# Patient Record
Sex: MALE | Race: WHITE
[De-identification: names, ages, dates, MRNs, and addresses within clinical notes are randomized per-mention and may not be internally consistent; named-entity substitution may affect disease eponyms.]

---

## 2017-09-09 ENCOUNTER — HOSPITAL ENCOUNTER (EMERGENCY)
Dept: HOSPITAL 54 - ER | Age: 37
Discharge: LEFT BEFORE DISCHARGE | End: 2017-09-09
Payer: COMMERCIAL

## 2017-09-09 VITALS — WEIGHT: 160 LBS | BODY MASS INDEX: 22.9 KG/M2 | HEIGHT: 70 IN

## 2017-09-09 VITALS — SYSTOLIC BLOOD PRESSURE: 110 MMHG | DIASTOLIC BLOOD PRESSURE: 70 MMHG

## 2017-09-09 DIAGNOSIS — Z91.018: ICD-10-CM

## 2017-09-09 DIAGNOSIS — T78.2XXA: Primary | ICD-10-CM

## 2017-09-09 DIAGNOSIS — Z91.011: ICD-10-CM

## 2017-09-09 PROCEDURE — Z7610: HCPCS

## 2017-09-09 PROCEDURE — A4606 OXYGEN PROBE USED W OXIMETER: HCPCS

## 2017-09-09 NOTE — NUR
To bed 8 a 35 yo male bibself w c/o allergic reaction to dairy x 1.5 hours ago, 
hives, appears flushed. No sob noted. breathing even and unalbored. vss. 
nondiaphoretic. placed on cardiac and vs monitor. Dr Munson at bedside to [facility].

## 2017-09-09 NOTE — NUR
Dr Munson talking to patient, patient decided to leave ama after being 
explained with the risks and benefits. AMA formed signed.

## 2017-09-09 NOTE — NUR
IV removed. Catheter intact and site benign. Pressure and 4x4 applied to site. 
No bleeding noted. Patient discharged to home in stable condition. Written and 
verbal after care instructions given. Patient verbalizes understanding of 
instruction. Patient is ambulatory with steady gait, Instructed not to drive. 
No further complaints.

## 2017-10-04 ENCOUNTER — HOSPITAL ENCOUNTER (EMERGENCY)
Dept: HOSPITAL 54 - ER | Age: 37
Discharge: HOME | End: 2017-10-04
Payer: COMMERCIAL

## 2017-10-04 VITALS — DIASTOLIC BLOOD PRESSURE: 82 MMHG | SYSTOLIC BLOOD PRESSURE: 128 MMHG

## 2017-10-04 VITALS — HEIGHT: 70 IN | BODY MASS INDEX: 23.62 KG/M2 | WEIGHT: 165 LBS
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
## 2017-10-04 DIAGNOSIS — T78.40XA: Primary | ICD-10-CM

## 2017-10-04 PROCEDURE — Z7610: HCPCS

## 2017-10-04 PROCEDURE — A4606 OXYGEN PROBE USED W OXIMETER: HCPCS

## 2017-10-04 PROCEDURE — 99284 EMERGENCY DEPT VISIT MOD MDM: CPT

## 2017-10-04 PROCEDURE — 96374 THER/PROPH/DIAG INJ IV PUSH: CPT

## 2017-10-04 PROCEDURE — 96375 TX/PRO/DX INJ NEW DRUG ADDON: CPT

## 2017-10-04 NOTE — NUR
PATIENT PRESENT TO ER C/O ALLERGIC REACTION AFTER EATING MEAL. PATIENT ALLERGIC 
TO DAIRY, BUT UNAWARE WHAT IS CAUSING REACTION.  PATIENT HAS GENERALIZED 
REDNESS ALL OVER, BUT NO SOB. VITALS STABLE. SAFETY AND COMFORT MEASURES IN 
PLACE. AWAITING MD ORDERS.

## 2017-10-04 NOTE — NUR
IV removed. Catheter intact and site benign. Pressure and 4x4 applied to site. 
No bleeding noted.  Patient discharged to home in stable condition. Written and 
verbal after care instructions given. Patient verbalizes understanding of 
instruction.

## 2019-06-09 ENCOUNTER — HOSPITAL ENCOUNTER (EMERGENCY)
Dept: HOSPITAL 54 - ER | Age: 39
Discharge: HOME | End: 2019-06-09
Payer: COMMERCIAL

## 2019-06-09 VITALS — DIASTOLIC BLOOD PRESSURE: 91 MMHG | SYSTOLIC BLOOD PRESSURE: 143 MMHG

## 2019-06-09 VITALS — HEIGHT: 70 IN | BODY MASS INDEX: 25.05 KG/M2 | WEIGHT: 175 LBS

## 2019-06-09 DIAGNOSIS — Z91.011: ICD-10-CM

## 2019-06-09 DIAGNOSIS — T78.2XXA: Primary | ICD-10-CM

## 2019-06-09 DIAGNOSIS — Z91.018: ICD-10-CM

## 2019-06-09 PROCEDURE — 96374 THER/PROPH/DIAG INJ IV PUSH: CPT

## 2019-06-09 PROCEDURE — 96375 TX/PRO/DX INJ NEW DRUG ADDON: CPT

## 2019-06-09 PROCEDURE — 96372 THER/PROPH/DIAG INJ SC/IM: CPT

## 2019-06-09 PROCEDURE — 99283 EMERGENCY DEPT VISIT LOW MDM: CPT

## 2019-06-09 NOTE — NUR
BIB SELF FROM HOME. AAOX4. NO SOB, BREATHING EVEN AND UNLABORED. AMBULATORY. 
C/O ALLERGIC REACTION. PT IS ALLERGIC TO MILK AND GOT EXPOSED TO ALLERGEN. PT 
HAS GENERALIZED BODY REDNESS, PROMINENT ON THE FACE, BIKLAT UPPER EXT AND UPPER 
BODY. PT REPORT BEING EXPOSED TO ALLERGEN 45 MIN AGO. -N/V/D. TO ER BED 2. 
PLACED ON MONITOR. MD AT BEDSIDE. AWATING ORDERS

## 2021-07-09 ENCOUNTER — HOSPITAL ENCOUNTER (EMERGENCY)
Dept: HOSPITAL 12 - ER | Age: 41
LOS: 1 days | Discharge: HOME | End: 2021-07-10
Payer: COMMERCIAL

## 2021-07-09 VITALS — BODY MASS INDEX: 26.66 KG/M2 | WEIGHT: 180 LBS | HEIGHT: 69 IN

## 2021-07-09 VITALS — DIASTOLIC BLOOD PRESSURE: 80 MMHG | SYSTOLIC BLOOD PRESSURE: 125 MMHG

## 2021-07-09 DIAGNOSIS — T78.3XXA: ICD-10-CM
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
## 2021-07-09 DIAGNOSIS — T78.1XXA: Primary | ICD-10-CM

## 2021-07-09 PROCEDURE — 96374 THER/PROPH/DIAG INJ IV PUSH: CPT

## 2021-07-09 PROCEDURE — 96375 TX/PRO/DX INJ NEW DRUG ADDON: CPT

## 2021-07-09 PROCEDURE — 99284 EMERGENCY DEPT VISIT MOD MDM: CPT

## 2021-07-09 PROCEDURE — A4663 DIALYSIS BLOOD PRESSURE CUFF: HCPCS

## 2021-07-09 NOTE — NUR
Patient discharged to home in stable condition.  Written and verbal after care 
instructions given. 

Patient verbalizes understanding of instructions.WENT HOME WITH ALL BELONGINGS 
. Stressed follow up or return to ER for worsening s/s.STEADY OF GAIT WENT HOME 
WITH STEADY GAIT WITH FRIEND AND GOING HOME VIA JJ .